# Patient Record
Sex: MALE | Race: WHITE | Employment: OTHER | URBAN - METROPOLITAN AREA
[De-identification: names, ages, dates, MRNs, and addresses within clinical notes are randomized per-mention and may not be internally consistent; named-entity substitution may affect disease eponyms.]

---

## 2017-08-28 ENCOUNTER — FOLLOW-UP (OUTPATIENT)
Dept: URBAN - METROPOLITAN AREA CLINIC 14 | Facility: CLINIC | Age: 72
Setting detail: DERMATOLOGY
End: 2017-08-28

## 2017-08-28 DIAGNOSIS — C44.42 SQUAMOUS CELL CARCINOMA OF SKIN OF SCALP AND NECK: ICD-10-CM

## 2017-08-28 PROBLEM — Z85828 V10.83: Status: ACTIVE | Noted: 2017-08-28

## 2017-08-28 PROCEDURE — 99212 OFFICE O/P EST SF 10 MIN: CPT

## 2017-08-28 PROCEDURE — 17000 DESTRUCT PREMALG LESION: CPT

## 2017-08-28 PROCEDURE — 17003 DESTRUCT PREMALG LES 2-14: CPT

## 2018-02-19 ENCOUNTER — FOLLOW-UP (OUTPATIENT)
Dept: URBAN - METROPOLITAN AREA CLINIC 14 | Facility: CLINIC | Age: 73
Setting detail: DERMATOLOGY
End: 2018-02-19

## 2018-02-19 DIAGNOSIS — L57.0 ACTINIC KERATOSIS: ICD-10-CM

## 2018-02-19 PROBLEM — Z85828 V10.83: Status: ACTIVE | Noted: 2018-02-19

## 2018-02-19 PROCEDURE — 17000 DESTRUCT PREMALG LESION: CPT

## 2018-02-19 PROCEDURE — 99214 OFFICE O/P EST MOD 30 MIN: CPT

## 2018-02-19 PROCEDURE — 17003 DESTRUCT PREMALG LES 2-14: CPT

## 2018-09-06 ENCOUNTER — FOLLOW-UP (OUTPATIENT)
Dept: URBAN - METROPOLITAN AREA CLINIC 14 | Facility: CLINIC | Age: 73
Setting detail: DERMATOLOGY
End: 2018-09-06

## 2018-09-06 DIAGNOSIS — C44.42 SQUAMOUS CELL CARCINOMA OF SKIN OF SCALP AND NECK: ICD-10-CM

## 2018-09-06 PROCEDURE — 99213 OFFICE O/P EST LOW 20 MIN: CPT

## 2018-09-06 PROCEDURE — 17003 DESTRUCT PREMALG LES 2-14: CPT

## 2018-09-06 PROCEDURE — 17000 DESTRUCT PREMALG LESION: CPT

## 2018-12-04 ENCOUNTER — FOLLOW-UP (OUTPATIENT)
Dept: URBAN - METROPOLITAN AREA CLINIC 14 | Facility: CLINIC | Age: 73
Setting detail: DERMATOLOGY
End: 2018-12-04

## 2018-12-04 DIAGNOSIS — L40.0 PSORIASIS VULGARIS: ICD-10-CM

## 2018-12-04 PROCEDURE — 99213 OFFICE O/P EST LOW 20 MIN: CPT

## 2018-12-04 PROCEDURE — 17110 DESTRUCT B9 LESION 1-14: CPT

## 2018-12-04 PROCEDURE — 17000 DESTRUCT PREMALG LESION: CPT

## 2018-12-04 PROCEDURE — 17003 DESTRUCT PREMALG LES 2-14: CPT

## 2019-09-13 ENCOUNTER — SKIN CHECK (OUTPATIENT)
Dept: URBAN - METROPOLITAN AREA CLINIC 14 | Facility: CLINIC | Age: 74
Setting detail: DERMATOLOGY
End: 2019-09-13

## 2019-09-13 DIAGNOSIS — D48.5 NEOPLASM OF UNCERTAIN BEHAVIOR OF SKIN: ICD-10-CM

## 2019-09-13 PROCEDURE — 17000 DESTRUCT PREMALG LESION: CPT

## 2019-09-13 PROCEDURE — 17003 DESTRUCT PREMALG LES 2-14: CPT

## 2019-09-13 PROCEDURE — 99213 OFFICE O/P EST LOW 20 MIN: CPT

## 2020-04-13 ENCOUNTER — SKIN CHECK (OUTPATIENT)
Dept: URBAN - METROPOLITAN AREA CLINIC 14 | Facility: CLINIC | Age: 75
Setting detail: DERMATOLOGY
End: 2020-04-13

## 2020-04-13 DIAGNOSIS — L24.9 IRRITANT CONTACT DERMATITIS, UNSPECIFIED CAUSE: ICD-10-CM

## 2020-04-13 PROBLEM — Z85828 V10.83: Status: ACTIVE | Noted: 2020-04-13

## 2020-04-13 PROCEDURE — OTHER COSMETIC: OTHER

## 2020-04-13 PROCEDURE — 99212 OFFICE O/P EST SF 10 MIN: CPT

## 2020-04-13 PROCEDURE — 11306 SHAVE SKIN LESION 0.6-1.0 CM: CPT

## 2020-04-13 PROCEDURE — 17003 DESTRUCT PREMALG LES 2-14: CPT

## 2020-04-13 PROCEDURE — 17000 DESTRUCT PREMALG LESION: CPT

## 2020-09-17 RX ORDER — MINOCYCLINE HYDROCHLORIDE 100 MG/1
1 CAPSULE CAPSULE ORAL DAILY
Qty: 30 | Refills: 0
Start: 2020-09-17

## 2020-09-17 RX ORDER — TAZAROTENE 1 MG/G
1 APPLICATION AEROSOL, FOAM TOPICAL DAILY
Qty: 100 | Refills: 0
Start: 2020-09-17

## 2020-10-21 ENCOUNTER — RX ONLY (RX ONLY)
Age: 75
End: 2020-10-21

## 2020-10-21 ENCOUNTER — SKIN CHECK (OUTPATIENT)
Dept: URBAN - METROPOLITAN AREA CLINIC 14 | Facility: CLINIC | Age: 75
Setting detail: DERMATOLOGY
End: 2020-10-21

## 2020-10-21 DIAGNOSIS — L70.0 ACNE VULGARIS: ICD-10-CM

## 2020-10-21 PROBLEM — Z85828 V10.83: Status: ACTIVE | Noted: 2020-10-21

## 2020-10-21 PROCEDURE — 17003 DESTRUCT PREMALG LES 2-14: CPT

## 2020-10-21 PROCEDURE — OTHER COSMETIC: OTHER

## 2020-10-21 PROCEDURE — 99214 OFFICE O/P EST MOD 30 MIN: CPT

## 2020-10-21 PROCEDURE — 17000 DESTRUCT PREMALG LESION: CPT

## 2020-10-21 RX ORDER — FLUOROURACIL 2 G/40G
1 A SMALL AMOUNT CREAM TOPICAL TWICE A DAY
Qty: 40 | Refills: 1
Start: 2020-10-21

## 2020-12-02 ENCOUNTER — FOLLOW-UP (OUTPATIENT)
Dept: URBAN - METROPOLITAN AREA CLINIC 14 | Facility: CLINIC | Age: 75
Setting detail: DERMATOLOGY
End: 2020-12-02

## 2020-12-02 DIAGNOSIS — Z12.83 ENCOUNTER FOR SCREENING FOR MALIGNANT NEOPLASM OF SKIN: ICD-10-CM

## 2020-12-02 PROCEDURE — 99212 OFFICE O/P EST SF 10 MIN: CPT

## 2021-02-03 ENCOUNTER — FOLLOW-UP (OUTPATIENT)
Dept: URBAN - METROPOLITAN AREA CLINIC 14 | Facility: CLINIC | Age: 76
Setting detail: DERMATOLOGY
End: 2021-02-03

## 2021-02-03 DIAGNOSIS — Z01.82 ENCOUNTER FOR ALLERGY TESTING: ICD-10-CM

## 2021-02-03 DIAGNOSIS — L23.9 ALLERGIC CONTACT DERMATITIS, UNSPECIFIED CAUSE: ICD-10-CM

## 2021-02-03 PROBLEM — Z85828 V10.83: Status: ACTIVE | Noted: 2021-02-03

## 2021-02-03 PROCEDURE — 99212 OFFICE O/P EST SF 10 MIN: CPT

## 2021-02-03 PROCEDURE — 17000 DESTRUCT PREMALG LESION: CPT

## 2021-09-29 ENCOUNTER — SKIN CHECK (OUTPATIENT)
Dept: URBAN - METROPOLITAN AREA CLINIC 14 | Facility: CLINIC | Age: 76
Setting detail: DERMATOLOGY
End: 2021-09-29

## 2021-09-29 DIAGNOSIS — L82.1 OTHER SEBORRHEIC KERATOSIS: ICD-10-CM

## 2021-09-29 DIAGNOSIS — L71.8 OTHER ROSACEA: ICD-10-CM

## 2021-09-29 DIAGNOSIS — L30.8 OTHER SPECIFIED DERMATITIS: ICD-10-CM

## 2021-09-29 DIAGNOSIS — L81.0 POSTINFLAMMATORY HYPERPIGMENTATION: ICD-10-CM

## 2021-09-29 PROCEDURE — 17003 DESTRUCT PREMALG LES 2-14: CPT

## 2021-09-29 PROCEDURE — 17000 DESTRUCT PREMALG LESION: CPT

## 2021-09-29 PROCEDURE — 99213 OFFICE O/P EST LOW 20 MIN: CPT

## 2022-09-27 ENCOUNTER — APPOINTMENT (OUTPATIENT)
Dept: URBAN - METROPOLITAN AREA CLINIC 210 | Age: 77
Setting detail: DERMATOLOGY
End: 2022-10-02

## 2022-09-27 DIAGNOSIS — L82.0 INFLAMED SEBORRHEIC KERATOSIS: ICD-10-CM

## 2022-09-27 DIAGNOSIS — L57.0 ACTINIC KERATOSIS: ICD-10-CM

## 2022-09-27 PROCEDURE — 17000 DESTRUCT PREMALG LESION: CPT | Mod: 59

## 2022-09-27 PROCEDURE — OTHER MIPS QUALITY: OTHER

## 2022-09-27 PROCEDURE — OTHER COUNSELING: OTHER

## 2022-09-27 PROCEDURE — OTHER LIQUID NITROGEN: OTHER

## 2022-09-27 PROCEDURE — 17110 DESTRUCT B9 LESION 1-14: CPT

## 2022-09-27 ASSESSMENT — LOCATION DETAILED DESCRIPTION DERM
LOCATION DETAILED: LEFT MEDIAL SUPERIOR CHEST
LOCATION DETAILED: RIGHT SUPERIOR MEDIAL MALAR CHEEK

## 2022-09-27 ASSESSMENT — LOCATION SIMPLE DESCRIPTION DERM
LOCATION SIMPLE: RIGHT CHEEK
LOCATION SIMPLE: CHEST

## 2022-09-27 ASSESSMENT — LOCATION ZONE DERM
LOCATION ZONE: FACE
LOCATION ZONE: TRUNK

## 2022-09-27 ASSESSMENT — TOTAL NUMBER OF LESIONS: # OF LESIONS?: 0

## 2022-09-27 NOTE — PROCEDURE: LIQUID NITROGEN
Show Applicator Variable?: Yes
Consent: The patient's consent was obtained including but not limited to risks of crusting, scabbing, blistering, scarring, darker or lighter pigmentary change, recurrence, incomplete removal and infection.
Detail Level: Detailed
Render Post-Care Instructions In Note?: no
Number Of Freeze-Thaw Cycles: 3 freeze-thaw cycles
Duration Of Freeze Thaw-Cycle (Seconds): 1
Post-Care Instructions: I reviewed with the patient in detail post-care instructions. Patient is to wear sunprotection, and avoid picking at any of the treated lesions. Pt may apply Vaseline to crusted or scabbing areas.
Application Tool (Optional): Cry-AC
Medical Necessity Information: It is in your best interest to select a reason for this procedure from the list below. All of these items fulfill various CMS LCD requirements except the new and changing color options.
Medical Necessity Clause: This procedure was medically necessary because the lesions that were treated were:
Spray Paint Text: The liquid nitrogen was applied to the skin utilizing a spray paint frosting technique.

## 2022-09-27 NOTE — PROCEDURE: MIPS QUALITY
Quality 226: Preventive Care And Screening: Tobacco Use: Screening And Cessation Intervention: Patient screened for tobacco use and is an ex/non-smoker
Quality 111:Pneumonia Vaccination Status For Older Adults: Pneumococcal vaccine (PPSV23) administered on or after patient’s 60th birthday and before the end of the measurement period
Name And Contact Information For Health Care Proxy: Linda
Quality 130: Documentation Of Current Medications In The Medical Record: Current Medications Documented
Detail Level: Detailed
Quality 47: Advance Care Plan: Advance Care Planning discussed and documented; advance care plan or surrogate decision maker documented in the medical record.
Quality 431: Preventive Care And Screening: Unhealthy Alcohol Use - Screening: Patient not identified as an unhealthy alcohol user when screened for unhealthy alcohol use using a systematic screening method
Quality 110: Preventive Care And Screening: Influenza Immunization: Influenza Immunization previously received during influenza season

## 2023-01-11 ENCOUNTER — APPOINTMENT (OUTPATIENT)
Dept: URBAN - METROPOLITAN AREA CLINIC 210 | Age: 78
Setting detail: DERMATOLOGY
End: 2023-01-15

## 2023-01-11 DIAGNOSIS — D485 NEOPLASM OF UNCERTAIN BEHAVIOR OF SKIN: ICD-10-CM

## 2023-01-11 DIAGNOSIS — L82.0 INFLAMED SEBORRHEIC KERATOSIS: ICD-10-CM

## 2023-01-11 DIAGNOSIS — L57.0 ACTINIC KERATOSIS: ICD-10-CM

## 2023-01-11 DIAGNOSIS — L82.1 OTHER SEBORRHEIC KERATOSIS: ICD-10-CM

## 2023-01-11 PROBLEM — D48.5 NEOPLASM OF UNCERTAIN BEHAVIOR OF SKIN: Status: ACTIVE | Noted: 2023-01-11

## 2023-01-11 PROCEDURE — 11102 TANGNTL BX SKIN SINGLE LES: CPT | Mod: 59

## 2023-01-11 PROCEDURE — 17003 DESTRUCT PREMALG LES 2-14: CPT | Mod: 59

## 2023-01-11 PROCEDURE — OTHER BIOPSY BY SHAVE METHOD: OTHER

## 2023-01-11 PROCEDURE — 99212 OFFICE O/P EST SF 10 MIN: CPT | Mod: 25

## 2023-01-11 PROCEDURE — 17000 DESTRUCT PREMALG LESION: CPT | Mod: 59

## 2023-01-11 PROCEDURE — 17110 DESTRUCT B9 LESION 1-14: CPT

## 2023-01-11 PROCEDURE — OTHER COUNSELING: OTHER

## 2023-01-11 PROCEDURE — OTHER LIQUID NITROGEN: OTHER

## 2023-01-11 PROCEDURE — OTHER MIPS QUALITY: OTHER

## 2023-01-11 PROCEDURE — 11103 TANGNTL BX SKIN EA SEP/ADDL: CPT | Mod: 59

## 2023-01-11 ASSESSMENT — LOCATION SIMPLE DESCRIPTION DERM
LOCATION SIMPLE: RIGHT CHEEK
LOCATION SIMPLE: UPPER BACK
LOCATION SIMPLE: RIGHT EYEBROW
LOCATION SIMPLE: LEFT LOWER BACK
LOCATION SIMPLE: RIGHT FOREHEAD
LOCATION SIMPLE: LEFT CHEEK
LOCATION SIMPLE: CHEST
LOCATION SIMPLE: RIGHT CHEEK

## 2023-01-11 ASSESSMENT — LOCATION DETAILED DESCRIPTION DERM
LOCATION DETAILED: RIGHT MEDIAL MALAR CHEEK
LOCATION DETAILED: RIGHT FOREHEAD
LOCATION DETAILED: SUPERIOR THORACIC SPINE
LOCATION DETAILED: LEFT CENTRAL MALAR CHEEK
LOCATION DETAILED: LEFT SUPERIOR MEDIAL MIDBACK
LOCATION DETAILED: LEFT MEDIAL INFERIOR CHEST
LOCATION DETAILED: RIGHT INFERIOR CENTRAL MALAR CHEEK
LOCATION DETAILED: RIGHT LATERAL EYEBROW
LOCATION DETAILED: RIGHT SUPERIOR FOREHEAD
LOCATION DETAILED: RIGHT MEDIAL MALAR CHEEK

## 2023-01-11 ASSESSMENT — LOCATION ZONE DERM
LOCATION ZONE: FACE
LOCATION ZONE: FACE
LOCATION ZONE: TRUNK

## 2023-01-11 NOTE — PROCEDURE: LIQUID NITROGEN
Detail Level: Detailed
Render Post-Care Instructions In Note?: no
Show Aperture Variable?: Yes
Consent: The patient's consent was obtained including but not limited to risks of crusting, scabbing, blistering, scarring, darker or lighter pigmentary change, recurrence, incomplete removal and infection.
Post-Care Instructions: I reviewed with the patient in detail post-care instructions. Patient is to wear sunprotection, and avoid picking at any of the treated lesions. Pt may apply Vaseline to crusted or scabbing areas.
Duration Of Freeze Thaw-Cycle (Seconds): 0
Medical Necessity Clause: This procedure was medically necessary because the lesions that were treated were:
Medical Necessity Information: It is in your best interest to select a reason for this procedure from the list below. All of these items fulfill various CMS LCD requirements except the new and changing color options.
Spray Paint Text: The liquid nitrogen was applied to the skin utilizing a spray paint frosting technique.

## 2023-02-01 ENCOUNTER — APPOINTMENT (OUTPATIENT)
Dept: URBAN - METROPOLITAN AREA CLINIC 210 | Age: 78
Setting detail: DERMATOLOGY
End: 2023-02-05

## 2023-02-01 PROBLEM — C44.329 SQUAMOUS CELL CARCINOMA OF SKIN OF OTHER PARTS OF FACE: Status: ACTIVE | Noted: 2023-02-01

## 2023-02-01 PROCEDURE — OTHER MIPS QUALITY: OTHER

## 2023-02-01 PROCEDURE — 17281 DSTR MAL LS F/E/E/N/L/M .6-1: CPT

## 2023-02-01 PROCEDURE — OTHER COUNSELING: OTHER

## 2023-02-01 PROCEDURE — OTHER CURETTAGE AND DESTRUCTION: OTHER

## 2023-02-01 NOTE — PROCEDURE: CURETTAGE AND DESTRUCTION
Detail Level: Detailed
Number Of Curettages: 1
Size Of Lesion In Cm: 0.3
Size Of Lesion After Curettage: 0.6
Add Intralesional Injection: No
Concentration (Mg/Ml Or Millions Of Plaque Forming Units/Cc): 0.01
Anesthesia Type: 2% lidocaine with epinephrine
Cautery Type: electrodesiccation
What Was Performed First?: Curettage
Final Size Statement: The size of the lesion after curettage was
Additional Information: (Optional): The wound was cleaned, and a pressure dressing was applied.  The patient received detailed post-op instructions.
Consent was obtained from the patient. The risks, benefits and alternatives to therapy were discussed in detail. Specifically, the risks of infection, scarring, bleeding, prolonged wound healing, nerve injury, incomplete removal, allergy to anesthesia and recurrence were addressed. Alternatives to ED&C, such as: surgical removal and XRT were also discussed.  Prior to the procedure, the treatment site was clearly identified and confirmed by the patient. All components of Universal Protocol/PAUSE Rule completed.
Post-Care Instructions: I reviewed with the patient in detail post-care instructions. Patient is to keep the area dry for 48 hours, and not to engage in any swimming until the area is healed. Should the patient develop any fevers, chills, bleeding, severe pain patient will contact the office immediately.
Bill As A Line Item Or As Units: Line Item

## 2023-02-01 NOTE — PROCEDURE: MIPS QUALITY
Quality 226: Preventive Care And Screening: Tobacco Use: Screening And Cessation Intervention: Patient screened for tobacco use and is an ex/non-smoker
Quality 431: Preventive Care And Screening: Unhealthy Alcohol Use - Screening: Patient not identified as an unhealthy alcohol user when screened for unhealthy alcohol use using a systematic screening method
Detail Level: Detailed
Quality 47: Advance Care Plan: Advance Care Planning discussed and documented in the medical record; patient did not wish or was not able to name a surrogate decision maker or provide an advance care plan.
Quality 110: Preventive Care And Screening: Influenza Immunization: Influenza Immunization Administered during Influenza season

## 2023-03-22 ENCOUNTER — APPOINTMENT (OUTPATIENT)
Dept: URBAN - METROPOLITAN AREA CLINIC 210 | Age: 78
Setting detail: DERMATOLOGY
End: 2023-03-26

## 2023-03-22 DIAGNOSIS — Z85.828 PERSONAL HISTORY OF OTHER MALIGNANT NEOPLASM OF SKIN: ICD-10-CM

## 2023-03-22 PROCEDURE — OTHER NO CHARGE VISIT: OTHER

## 2023-03-22 PROCEDURE — OTHER COUNSELING: OTHER

## 2023-03-22 ASSESSMENT — LOCATION DETAILED DESCRIPTION DERM: LOCATION DETAILED: RIGHT CENTRAL MALAR CHEEK

## 2023-03-22 ASSESSMENT — LOCATION SIMPLE DESCRIPTION DERM: LOCATION SIMPLE: RIGHT CHEEK

## 2023-03-22 ASSESSMENT — LOCATION ZONE DERM: LOCATION ZONE: FACE

## 2023-10-02 ENCOUNTER — APPOINTMENT (OUTPATIENT)
Dept: URBAN - METROPOLITAN AREA CLINIC 210 | Age: 78
Setting detail: DERMATOLOGY
End: 2023-10-07

## 2023-10-02 DIAGNOSIS — L57.0 ACTINIC KERATOSIS: ICD-10-CM

## 2023-10-02 DIAGNOSIS — Z12.83 ENCOUNTER FOR SCREENING FOR MALIGNANT NEOPLASM OF SKIN: ICD-10-CM

## 2023-10-02 DIAGNOSIS — L82.1 OTHER SEBORRHEIC KERATOSIS: ICD-10-CM

## 2023-10-02 DIAGNOSIS — L81.2 FRECKLES: ICD-10-CM

## 2023-10-02 DIAGNOSIS — L70.8 OTHER ACNE: ICD-10-CM

## 2023-10-02 DIAGNOSIS — D22 MELANOCYTIC NEVI: ICD-10-CM

## 2023-10-02 PROBLEM — D22.5 MELANOCYTIC NEVI OF TRUNK: Status: ACTIVE | Noted: 2023-10-02

## 2023-10-02 PROCEDURE — OTHER LIQUID NITROGEN: OTHER

## 2023-10-02 PROCEDURE — 17000 DESTRUCT PREMALG LESION: CPT

## 2023-10-02 PROCEDURE — OTHER EXTRACTIONS: OTHER

## 2023-10-02 PROCEDURE — 17003 DESTRUCT PREMALG LES 2-14: CPT

## 2023-10-02 PROCEDURE — 99213 OFFICE O/P EST LOW 20 MIN: CPT | Mod: 25

## 2023-10-02 PROCEDURE — OTHER ADDITIONAL NOTES: OTHER

## 2023-10-02 PROCEDURE — OTHER COUNSELING: OTHER

## 2023-10-02 PROCEDURE — OTHER MIPS QUALITY: OTHER

## 2023-10-02 ASSESSMENT — LOCATION ZONE DERM
LOCATION ZONE: FACE
LOCATION ZONE: SCALP
LOCATION ZONE: TRUNK
LOCATION ZONE: ARM
LOCATION ZONE: EAR
LOCATION ZONE: NECK

## 2023-10-02 ASSESSMENT — LOCATION SIMPLE DESCRIPTION DERM
LOCATION SIMPLE: LEFT TEMPLE
LOCATION SIMPLE: LEFT ANTERIOR NECK
LOCATION SIMPLE: LEFT OCCIPITAL SCALP
LOCATION SIMPLE: RIGHT EAR
LOCATION SIMPLE: LEFT CHEEK
LOCATION SIMPLE: RIGHT CLAVICULAR SKIN
LOCATION SIMPLE: POSTERIOR SCALP
LOCATION SIMPLE: LEFT UPPER BACK
LOCATION SIMPLE: CHEST
LOCATION SIMPLE: LEFT SHOULDER
LOCATION SIMPLE: RIGHT CHEEK
LOCATION SIMPLE: RIGHT SHOULDER
LOCATION SIMPLE: SCALP
LOCATION SIMPLE: LEFT ZYGOMA
LOCATION SIMPLE: RIGHT LOWER BACK
LOCATION SIMPLE: ABDOMEN
LOCATION SIMPLE: LEFT EAR
LOCATION SIMPLE: POSTERIOR NECK
LOCATION SIMPLE: LEFT FOREHEAD

## 2023-10-02 ASSESSMENT — LOCATION DETAILED DESCRIPTION DERM
LOCATION DETAILED: LEFT SUPERIOR PARIETAL SCALP
LOCATION DETAILED: STERNUM
LOCATION DETAILED: RIGHT POSTERIOR SHOULDER
LOCATION DETAILED: RIGHT SUPERIOR PARIETAL SCALP
LOCATION DETAILED: RIGHT MEDIAL INFERIOR CHEST
LOCATION DETAILED: RIGHT ANTIHELIX
LOCATION DETAILED: PERIUMBILICAL SKIN
LOCATION DETAILED: LEFT SUPERIOR OCCIPITAL SCALP
LOCATION DETAILED: LEFT POSTERIOR SHOULDER
LOCATION DETAILED: LEFT CENTRAL ZYGOMA
LOCATION DETAILED: LEFT SUPERIOR POSTERIOR HELIX
LOCATION DETAILED: LEFT FOREHEAD
LOCATION DETAILED: MID-OCCIPITAL SCALP
LOCATION DETAILED: LEFT CENTRAL TEMPLE
LOCATION DETAILED: LEFT LATERAL MALAR CHEEK
LOCATION DETAILED: RIGHT SUPERIOR CENTRAL MALAR CHEEK
LOCATION DETAILED: RIGHT MEDIAL TRAPEZIAL NECK
LOCATION DETAILED: RIGHT CLAVICULAR SKIN
LOCATION DETAILED: RIGHT RIB CAGE
LOCATION DETAILED: LEFT RIB CAGE
LOCATION DETAILED: LEFT MID-UPPER BACK
LOCATION DETAILED: STERNAL NOTCH
LOCATION DETAILED: LEFT CLAVICULAR NECK
LOCATION DETAILED: LEFT CENTRAL PARIETAL SCALP
LOCATION DETAILED: LEFT MEDIAL SUPERIOR CHEST
LOCATION DETAILED: RIGHT SUPERIOR MEDIAL MIDBACK

## 2023-10-02 ASSESSMENT — PAIN INTENSITY VAS: HOW INTENSE IS YOUR PAIN 0 BEING NO PAIN, 10 BEING THE MOST SEVERE PAIN POSSIBLE?: NO PAIN

## 2023-10-02 NOTE — PROCEDURE: ADDITIONAL NOTES
Render Risk Assessment In Note?: no
Additional Notes: Cryotherapy to some at no additional charge as courtesy to pt.
Detail Level: Simple

## 2023-10-02 NOTE — HPI: EVALUATION OF SKIN LESION(S)
What Type Of Note Output Would You Prefer (Optional)?: Standard Output
Hpi Title: Evaluation of Skin Lesions
Additional History: Spots on scalp\\nOpen comedo near right eye, would like this extracted.
Family Member: Father

## 2023-10-02 NOTE — PROCEDURE: MIPS QUALITY
Quality 431: Preventive Care And Screening: Unhealthy Alcohol Use - Screening: Patient not identified as an unhealthy alcohol user when screened for unhealthy alcohol use using a systematic screening method
Quality 111:Pneumonia Vaccination Status For Older Adults: Patient received any pneumococcal conjugate or polysaccharide vaccine on or after their 60th birthday and before the end of the measurement period
Quality 226: Preventive Care And Screening: Tobacco Use: Screening And Cessation Intervention: Patient screened for tobacco use and is an ex/non-smoker
Quality 130: Documentation Of Current Medications In The Medical Record: Current Medications Documented
Detail Level: Detailed

## 2023-10-02 NOTE — PROCEDURE: LIQUID NITROGEN
Duration Of Freeze Thaw-Cycle (Seconds): 5
Consent: The patient's consent was obtained including but not limited to risks of crusting, scabbing, blistering, scarring, darker or lighter pigmentary change, recurrence, incomplete removal and infection.
Number Of Freeze-Thaw Cycles: 1 freeze-thaw cycle
Post-Care Instructions: I reviewed with the patient in detail post-care instructions. Patient is to wear sunprotection, and avoid picking at any of the treated lesions. Pt may apply Vaseline to crusted or scabbing areas.
Application Tool (Optional): Cry-AC
Detail Level: Detailed
Show Aperture Variable?: Yes
Render Post-Care Instructions In Note?: no

## 2023-10-02 NOTE — PROCEDURE: EXTRACTIONS
Post-Care Instructions: I reviewed with the patient in detail post-care instructions. Patient is to keep the treatment areas dry overnight, and then apply bacitracin twice daily until healed. Patient may apply hydrogen peroxide soaks to remove any crusting.
Prep Text (Optional): Prior to removal the treatment areas were prepped in the usual fashion.
Render Post-Care Instructions In Note?: no
Extraction Method: 20 gauge needle and comedo extractor
Acne Type: Comedonal Lesions
Consent was obtained and risks were reviewed including but not limited to scarring, infection, bleeding, scabbing, incomplete removal, and allergy to anesthesia.
Detail Level: Detailed

## 2024-08-01 ENCOUNTER — APPOINTMENT (OUTPATIENT)
Dept: URBAN - METROPOLITAN AREA CLINIC 210 | Age: 79
Setting detail: DERMATOLOGY
End: 2024-08-01

## 2024-08-01 DIAGNOSIS — Z12.83 ENCOUNTER FOR SCREENING FOR MALIGNANT NEOPLASM OF SKIN: ICD-10-CM

## 2024-08-01 DIAGNOSIS — L73.8 OTHER SPECIFIED FOLLICULAR DISORDERS: ICD-10-CM

## 2024-08-01 DIAGNOSIS — L57.0 ACTINIC KERATOSIS: ICD-10-CM

## 2024-08-01 DIAGNOSIS — D22 MELANOCYTIC NEVI: ICD-10-CM

## 2024-08-01 DIAGNOSIS — L81.2 FRECKLES: ICD-10-CM

## 2024-08-01 DIAGNOSIS — L82.0 INFLAMED SEBORRHEIC KERATOSIS: ICD-10-CM

## 2024-08-01 DIAGNOSIS — L82.1 OTHER SEBORRHEIC KERATOSIS: ICD-10-CM

## 2024-08-01 PROBLEM — D22.5 MELANOCYTIC NEVI OF TRUNK: Status: ACTIVE | Noted: 2024-08-01

## 2024-08-01 PROCEDURE — 17000 DESTRUCT PREMALG LESION: CPT | Mod: 59

## 2024-08-01 PROCEDURE — 99213 OFFICE O/P EST LOW 20 MIN: CPT | Mod: 25

## 2024-08-01 PROCEDURE — OTHER MIPS QUALITY: OTHER

## 2024-08-01 PROCEDURE — 17110 DESTRUCT B9 LESION 1-14: CPT

## 2024-08-01 PROCEDURE — OTHER ADDITIONAL NOTES: OTHER

## 2024-08-01 PROCEDURE — 17003 DESTRUCT PREMALG LES 2-14: CPT | Mod: 59

## 2024-08-01 PROCEDURE — OTHER LIQUID NITROGEN: OTHER

## 2024-08-01 PROCEDURE — OTHER COUNSELING: OTHER

## 2024-08-01 ASSESSMENT — LOCATION DETAILED DESCRIPTION DERM
LOCATION DETAILED: LEFT ANTERIOR SHOULDER
LOCATION DETAILED: RIGHT SUPERIOR OCCIPITAL SCALP
LOCATION DETAILED: RIGHT MEDIAL INFERIOR CHEST
LOCATION DETAILED: RIGHT SUPERIOR FOREHEAD
LOCATION DETAILED: LEFT MEDIAL SUPERIOR CHEST
LOCATION DETAILED: STERNUM
LOCATION DETAILED: RIGHT LATERAL BUCCAL CHEEK
LOCATION DETAILED: LEFT LATERAL MALAR CHEEK
LOCATION DETAILED: RIGHT POSTERIOR SHOULDER
LOCATION DETAILED: LEFT MID-UPPER BACK
LOCATION DETAILED: LEFT CENTRAL TEMPLE
LOCATION DETAILED: RIGHT SUPERIOR HELIX
LOCATION DETAILED: LEFT SUPERIOR PARIETAL SCALP
LOCATION DETAILED: LEFT SUPERIOR LATERAL BUCCAL CHEEK
LOCATION DETAILED: RIGHT SUPERIOR PARIETAL SCALP
LOCATION DETAILED: LEFT POSTERIOR SHOULDER
LOCATION DETAILED: LEFT SUPERIOR LATERAL NECK
LOCATION DETAILED: PERIUMBILICAL SKIN
LOCATION DETAILED: RIGHT MEDIAL TRAPEZIAL NECK
LOCATION DETAILED: LEFT SUPERIOR HELIX
LOCATION DETAILED: STERNAL NOTCH
LOCATION DETAILED: LEFT RIB CAGE

## 2024-08-01 ASSESSMENT — LOCATION SIMPLE DESCRIPTION DERM
LOCATION SIMPLE: LEFT CHEEK
LOCATION SIMPLE: RIGHT CHEEK
LOCATION SIMPLE: POSTERIOR NECK
LOCATION SIMPLE: CHEST
LOCATION SIMPLE: LEFT UPPER BACK
LOCATION SIMPLE: SCALP
LOCATION SIMPLE: RIGHT EAR
LOCATION SIMPLE: LEFT EAR
LOCATION SIMPLE: RIGHT FOREHEAD
LOCATION SIMPLE: LEFT TEMPLE
LOCATION SIMPLE: LEFT SHOULDER
LOCATION SIMPLE: RIGHT OCCIPITAL SCALP
LOCATION SIMPLE: RIGHT SHOULDER
LOCATION SIMPLE: NECK
LOCATION SIMPLE: ABDOMEN

## 2024-08-01 ASSESSMENT — LOCATION ZONE DERM
LOCATION ZONE: ARM
LOCATION ZONE: SCALP
LOCATION ZONE: NECK
LOCATION ZONE: EAR
LOCATION ZONE: TRUNK
LOCATION ZONE: FACE

## 2024-08-01 NOTE — PROCEDURE: ADDITIONAL NOTES
Render Risk Assessment In Note?: no
Additional Notes: Cryotherapy to some at no additional charge as courtesy to pt.
Detail Level: Simple
not currently prescribed any medication.

## 2024-08-01 NOTE — PROCEDURE: LIQUID NITROGEN
Consent: The patient's consent was obtained including but not limited to risks of crusting, scabbing, blistering, scarring, darker or lighter pigmentary change, recurrence, incomplete removal and infection.
Show Aperture Variable?: Yes
Duration Of Freeze Thaw-Cycle (Seconds): 3
Number Of Freeze-Thaw Cycles: 2 freeze-thaw cycles
Render Note In Bullet Format When Appropriate: No
Detail Level: Detailed
Post-Care Instructions: I reviewed with the patient in detail post-care instructions. Patient is to wear sunprotection, and avoid picking at any of the treated lesions. Pt may apply Vaseline to crusted or scabbing areas.
Application Tool (Optional): Cry-AC
Spray Paint Text: The liquid nitrogen was applied to the skin utilizing a spray paint frosting technique.
Medical Necessity Clause: This procedure was medically necessary because the lesions that were treated were:
Medical Necessity Information: It is in your best interest to select a reason for this procedure from the list below. All of these items fulfill various CMS LCD requirements except the new and changing color options.

## 2025-06-02 ENCOUNTER — APPOINTMENT (OUTPATIENT)
Dept: URBAN - METROPOLITAN AREA CLINIC 210 | Age: 80
Setting detail: DERMATOLOGY
End: 2025-06-02

## 2025-06-02 DIAGNOSIS — L57.8 OTHER SKIN CHANGES DUE TO CHRONIC EXPOSURE TO NONIONIZING RADIATION: ICD-10-CM

## 2025-06-02 DIAGNOSIS — D18.0 HEMANGIOMA: ICD-10-CM

## 2025-06-02 DIAGNOSIS — L82.0 INFLAMED SEBORRHEIC KERATOSIS: ICD-10-CM

## 2025-06-02 DIAGNOSIS — L82.1 OTHER SEBORRHEIC KERATOSIS: ICD-10-CM

## 2025-06-02 DIAGNOSIS — D69.2 OTHER NONTHROMBOCYTOPENIC PURPURA: ICD-10-CM

## 2025-06-02 DIAGNOSIS — L57.0 ACTINIC KERATOSIS: ICD-10-CM

## 2025-06-02 DIAGNOSIS — Z12.83 ENCOUNTER FOR SCREENING FOR MALIGNANT NEOPLASM OF SKIN: ICD-10-CM

## 2025-06-02 DIAGNOSIS — D22 MELANOCYTIC NEVI: ICD-10-CM

## 2025-06-02 DIAGNOSIS — D485 NEOPLASM OF UNCERTAIN BEHAVIOR OF SKIN: ICD-10-CM

## 2025-06-02 PROBLEM — D18.01 HEMANGIOMA OF SKIN AND SUBCUTANEOUS TISSUE: Status: ACTIVE | Noted: 2025-06-02

## 2025-06-02 PROBLEM — D48.5 NEOPLASM OF UNCERTAIN BEHAVIOR OF SKIN: Status: ACTIVE | Noted: 2025-06-02

## 2025-06-02 PROBLEM — D22.5 MELANOCYTIC NEVI OF TRUNK: Status: ACTIVE | Noted: 2025-06-02

## 2025-06-02 PROCEDURE — 17000 DESTRUCT PREMALG LESION: CPT | Mod: 59

## 2025-06-02 PROCEDURE — OTHER MIPS QUALITY: OTHER

## 2025-06-02 PROCEDURE — OTHER BIOPSY BY SHAVE METHOD: OTHER

## 2025-06-02 PROCEDURE — 17003 DESTRUCT PREMALG LES 2-14: CPT | Mod: 59

## 2025-06-02 PROCEDURE — 99213 OFFICE O/P EST LOW 20 MIN: CPT | Mod: 25

## 2025-06-02 PROCEDURE — 11102 TANGNTL BX SKIN SINGLE LES: CPT | Mod: 59

## 2025-06-02 PROCEDURE — OTHER COUNSELING: OTHER

## 2025-06-02 PROCEDURE — OTHER LIQUID NITROGEN: OTHER

## 2025-06-02 PROCEDURE — 17110 DESTRUCT B9 LESION 1-14: CPT

## 2025-06-02 ASSESSMENT — LOCATION ZONE DERM
LOCATION ZONE: TRUNK
LOCATION ZONE: SCALP
LOCATION ZONE: FACE
LOCATION ZONE: ARM

## 2025-06-02 ASSESSMENT — LOCATION DETAILED DESCRIPTION DERM
LOCATION DETAILED: RIGHT DISTAL DORSAL FOREARM
LOCATION DETAILED: LEFT CENTRAL MALAR CHEEK
LOCATION DETAILED: RIGHT MEDIAL FRONTAL SCALP
LOCATION DETAILED: SUPERIOR THORACIC SPINE
LOCATION DETAILED: RIGHT INFERIOR CENTRAL MALAR CHEEK
LOCATION DETAILED: RIGHT SUPERIOR LATERAL BUCCAL CHEEK
LOCATION DETAILED: RIGHT INFERIOR LATERAL MALAR CHEEK
LOCATION DETAILED: RIGHT INFERIOR MEDIAL UPPER BACK
LOCATION DETAILED: RIGHT SUPERIOR CENTRAL MALAR CHEEK
LOCATION DETAILED: RIGHT SUPERIOR LATERAL MALAR CHEEK
LOCATION DETAILED: RIGHT LATERAL BUCCAL CHEEK
LOCATION DETAILED: RIGHT MID-UPPER BACK
LOCATION DETAILED: LEFT CENTRAL ZYGOMA
LOCATION DETAILED: LEFT PROXIMAL DORSAL FOREARM
LOCATION DETAILED: LEFT SUPERIOR LATERAL MIDBACK
LOCATION DETAILED: LEFT DISTAL DORSAL FOREARM
LOCATION DETAILED: RIGHT PROXIMAL DORSAL FOREARM
LOCATION DETAILED: RIGHT SUPERIOR MEDIAL UPPER BACK
LOCATION DETAILED: RIGHT MEDIAL UPPER BACK

## 2025-06-02 ASSESSMENT — LOCATION SIMPLE DESCRIPTION DERM
LOCATION SIMPLE: LEFT FOREARM
LOCATION SIMPLE: RIGHT SCALP
LOCATION SIMPLE: UPPER BACK
LOCATION SIMPLE: LEFT LOWER BACK
LOCATION SIMPLE: LEFT CHEEK
LOCATION SIMPLE: LEFT ZYGOMA
LOCATION SIMPLE: RIGHT FOREARM
LOCATION SIMPLE: RIGHT CHEEK
LOCATION SIMPLE: RIGHT UPPER BACK

## 2025-06-26 ENCOUNTER — APPOINTMENT (OUTPATIENT)
Dept: URBAN - METROPOLITAN AREA CLINIC 210 | Age: 80
Setting detail: DERMATOLOGY
End: 2025-06-29

## 2025-06-26 PROBLEM — C44.519 BASAL CELL CARCINOMA OF SKIN OF OTHER PART OF TRUNK: Status: ACTIVE | Noted: 2025-06-26

## 2025-06-26 PROCEDURE — OTHER COUNSELING: OTHER

## 2025-06-26 PROCEDURE — OTHER CURETTAGE AND DESTRUCTION: OTHER

## 2025-06-26 PROCEDURE — OTHER MIPS QUALITY: OTHER

## 2025-06-26 PROCEDURE — 17262 DSTRJ MAL LES T/A/L 1.1-2.0: CPT
